# Patient Record
Sex: MALE | Race: WHITE | NOT HISPANIC OR LATINO | Employment: OTHER | URBAN - METROPOLITAN AREA
[De-identification: names, ages, dates, MRNs, and addresses within clinical notes are randomized per-mention and may not be internally consistent; named-entity substitution may affect disease eponyms.]

---

## 2020-10-23 ENCOUNTER — HOSPITAL ENCOUNTER (OUTPATIENT)
Dept: CT IMAGING | Facility: HOSPITAL | Age: 60
Discharge: HOME | End: 2020-10-23
Attending: INTERNAL MEDICINE

## 2020-10-23 LAB
CREAT BLD-MCNC: 1.1 MG/DL (ref 0.6–1.4)
GFR SERPLBLD BASED ON 1.73 SQ M-ARVRAT: >60 ML/MIN/{1.73_M2}

## 2021-07-16 ENCOUNTER — TELEPHONE (OUTPATIENT)
Dept: UROLOGY | Facility: CLINIC | Age: 61
End: 2021-07-16

## 2021-07-16 NOTE — TELEPHONE ENCOUNTER
Js with Dr Webb office called requesting [facility], referring for Kidney Cyst, and enlarged prostate. CT done in Oct @ [facility] in . Sympt: NONE. Insur: Medicare.

## 2021-07-21 ENCOUNTER — TRANSCRIBE ORDERS (OUTPATIENT)
Dept: ADMINISTRATIVE | Facility: HOSPITAL | Age: 61
End: 2021-07-21

## 2021-07-21 DIAGNOSIS — N28.1 CYST OF RIGHT KIDNEY: Primary | ICD-10-CM

## 2021-07-22 ENCOUNTER — TRANSCRIBE ORDERS (OUTPATIENT)
Dept: ADMINISTRATIVE | Facility: HOSPITAL | Age: 61
End: 2021-07-22

## 2021-07-22 DIAGNOSIS — N28.1 RENAL CYST: Primary | ICD-10-CM

## 2021-07-28 ENCOUNTER — APPOINTMENT (OUTPATIENT)
Dept: ULTRASOUND IMAGING | Facility: HOSPITAL | Age: 61
End: 2021-07-28

## 2021-07-28 ENCOUNTER — HOSPITAL ENCOUNTER (OUTPATIENT)
Dept: ULTRASOUND IMAGING | Facility: HOSPITAL | Age: 61
Discharge: HOME | End: 2021-07-28
Admitting: INTERNAL MEDICINE

## 2021-07-28 DIAGNOSIS — N28.1 RENAL CYST: ICD-10-CM

## 2021-07-28 PROCEDURE — 76775 US EXAM ABDO BACK WALL LIM: CPT | Performed by: RADIOLOGY

## 2021-07-28 PROCEDURE — 76775 US EXAM ABDO BACK WALL LIM: CPT

## 2021-08-10 RX ORDER — VALACYCLOVIR HYDROCHLORIDE 500 MG/1
TABLET, FILM COATED ORAL
COMMUNITY
Start: 2021-07-15

## 2021-08-10 RX ORDER — LISINOPRIL AND HYDROCHLOROTHIAZIDE 12.5; 1 MG/1; MG/1
1 TABLET ORAL DAILY
COMMUNITY
Start: 2021-06-22

## 2021-08-10 RX ORDER — MONTELUKAST SODIUM 10 MG/1
10 TABLET ORAL DAILY
COMMUNITY
Start: 2021-06-22

## 2021-08-10 RX ORDER — SIMVASTATIN 20 MG
20 TABLET ORAL DAILY
COMMUNITY

## 2021-08-10 RX ORDER — IBUPROFEN 400 MG/1
400 TABLET ORAL DAILY
COMMUNITY

## 2021-08-11 ENCOUNTER — OFFICE VISIT (OUTPATIENT)
Dept: UROLOGY | Facility: CLINIC | Age: 61
End: 2021-08-11

## 2021-08-11 VITALS
SYSTOLIC BLOOD PRESSURE: 133 MMHG | HEIGHT: 68 IN | BODY MASS INDEX: 27.19 KG/M2 | DIASTOLIC BLOOD PRESSURE: 70 MMHG | WEIGHT: 179.4 LBS

## 2021-08-11 DIAGNOSIS — N40.1 BPH ASSOCIATED WITH NOCTURIA: Primary | ICD-10-CM

## 2021-08-11 DIAGNOSIS — R35.1 BPH ASSOCIATED WITH NOCTURIA: Primary | ICD-10-CM

## 2021-08-11 DIAGNOSIS — N28.1 RENAL CYST: ICD-10-CM

## 2021-08-11 PROCEDURE — 99202 OFFICE O/P NEW SF 15 MIN: CPT | Performed by: UROLOGY

## 2021-08-11 NOTE — ASSESSMENT & PLAN NOTE
No further work-up needed as this is a simple cyst.  He will let me know if he has any further pain.

## 2021-08-11 NOTE — PROGRESS NOTES
"Chief Complaint  Cyst (renal)    Subjective          Narendra Michaud presents to [facility]  Patient had a sharp pain on his right lower groin and had a CT scan done in October 2020.  It showed a cyst.  Report as below.    CT scan CONCLUSION in Oct. 2020:     1. Sigmoid diverticula are noted without changes of acute diverticulitis.    2. Right renal cyst.    3. Degenerative changes are noted involving the lumbar spine and sacroiliac joints.    4. Prostate is slightly prominent size.      He then had a follow-up ultrasound which showed the same cyst.  It is a simple cyst.  His pain has returned and just to an occasional dull ache.    Ultrasound FINDINGS July 2021:  Right kidney:  Right kidney measures 10 x 4.4 x 4.8 cm with homogeneous echotexture without   hydronephrosis.  Macrolobular, partially exophytic 6.6 x 5.8 x 5.8 cm simple appearing cortical   cyst.  Central right renal vascular flow visualized.     Left kidney:  Left kidney measures 10.4 x 4.8 x 5.3 cm with homogeneous echotexture without   hydronephrosis or mass effect.  Left renal vascular flow visualized.     Urinary bladder has a normal well distended appearance and bilateral ureteral jets are visualized.       CONCLUSION:   1. Right kidney:  6.6 cm partially exophytic right renal cortical cyst.  2. Left kidney:  Within normal limits.    He denies any problems with emptying his bladder.  States that his stream is a little bit slow towards the end and he gets up twice at night but denies any frequency or urgency.      Objective   Vital Signs:   /70   Ht 172.7 cm (68\")   Wt 81.4 kg (179 lb 6.4 oz)   BMI 27.28 kg/m²     Physical Exam  Vitals and nursing note reviewed.   Constitutional:       Appearance: Normal appearance. He is well-developed.   Pulmonary:      Effort: Pulmonary effort is normal.      Breath sounds: Normal air entry.   Neurological:      Mental Status: He is alert and oriented to person, place, and " time.      Motor: Motor function is intact.   Psychiatric:         Mood and Affect: Mood normal.         Behavior: Behavior normal.        Result Review :                  Assessment and Plan    Diagnoses and all orders for this visit:    1. BPH associated with nocturia (Primary)  Assessment & Plan:  No treatment needed at this time.  He will continue to monitor symptoms.      2. Renal cyst  Assessment & Plan:  No further work-up needed as this is a simple cyst.  He will let me know if he has any further pain.        Follow Up   No follow-ups on file.  Patient was given instructions and counseling regarding his condition or for health maintenance advice. Please see specific information pulled into the AVS if appropriate.